# Patient Record
Sex: MALE | URBAN - METROPOLITAN AREA
[De-identification: names, ages, dates, MRNs, and addresses within clinical notes are randomized per-mention and may not be internally consistent; named-entity substitution may affect disease eponyms.]

---

## 2021-08-02 LAB
ABS NEUT (OLG): 3.86 X10(3)/MCL (ref 2.1–9.2)
BASOPHILS NFR BLD MANUAL: 4 % (ref 0–2)
EOSINOPHIL NFR BLD MANUAL: 1 % (ref 0–8)
ERYTHROCYTE [DISTWIDTH] IN BLOOD BY AUTOMATED COUNT: 12.6 % (ref 11.5–17)
HCT VFR BLD AUTO: 46.4 % (ref 42–52)
HGB BLD-MCNC: 15.4 GM/DL (ref 14–18)
LYMPHOCYTES NFR BLD MANUAL: 14 % (ref 13–40)
MCH RBC QN AUTO: 30.7 PG (ref 27–31)
MCHC RBC AUTO-ENTMCNC: 33.2 GM/DL (ref 33–36)
MCV RBC AUTO: 92.4 FL (ref 80–94)
MONOCYTES NFR BLD MANUAL: 8 % (ref 2–11)
NEUTROPHILS NFR BLD MANUAL: 72 % (ref 47–80)
PLATELET # BLD AUTO: 337 X10(3)/MCL (ref 130–400)
PLATELET # BLD EST: NORMAL 10*3/UL
PMV BLD AUTO: 10.1 FL (ref 7.4–10.4)
RBC # BLD AUTO: 5.02 X10(6)/MCL (ref 4.7–6.1)
RBC MORPH BLD: NORMAL
SARS-COV-2 AG RESP QL IA.RAPID: POSITIVE
WBC # SPEC AUTO: 6.7 X10(3)/MCL (ref 4.5–11.5)

## 2021-08-04 ENCOUNTER — HISTORICAL (OUTPATIENT)
Dept: PREADMISSION TESTING | Facility: HOSPITAL | Age: 46
End: 2021-08-04

## 2021-09-29 ENCOUNTER — HISTORICAL (OUTPATIENT)
Dept: ADMINISTRATIVE | Facility: HOSPITAL | Age: 46
End: 2021-09-29

## 2021-10-27 ENCOUNTER — HISTORICAL (OUTPATIENT)
Dept: SURGERY | Facility: HOSPITAL | Age: 46
End: 2021-10-27

## 2022-05-01 NOTE — OP NOTE
DATE OF SURGERY:    10/27/2021    SURGEON:  Hiren Raines MD    PREOPERATIVE DIAGNOSIS:  Crush injury, left middle finger.    POSTOPERATIVE DIAGNOSIS:  Crush injury, left middle finger.    PROCEDURE:  Amputation, left middle finger distal interphalangeal joint.    INDICATION FOR PROCEDURE:  Jean Sands is a 46-year-old male who suffered a crush injury to his left middle finger.  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  He has an unstable joint.  He presents for amputation of the DIP joint.    ANESTHESIA:  General.    COMPLICATIONS:  None.    PROCEDURE IN DETAIL:  The patient was endotracheally intubated, prepped and draped in the usual sterile fashion.  Esmarch was used to exsanguinate the left upper extremity, and tourniquet was inflated to 250 mmHg.       I made a fishmouth type incision at the DIP joint of the left middle finger using a 15 blade scalpel.  The extensor and flexor tendons were cut sharply.  A rongeur was used to remove the cartilaginous cap at the distal aspect of the middle phalanx.  Neurovascular bundles were cauterized.  The dorsal and volar skin was reapproximated using a running 3-0 Monocryl.  Sterile dressing was applied.  No complications.       I was scrubbed and present for the entire procedure.        ______________________________  Hiren Raines MD    BF/UL  DD:  10/27/2021  Time:  12:10PM  DT:  10/27/2021  Time:  12:16PM  Job #:  [id number]